# Patient Record
Sex: FEMALE | Race: BLACK OR AFRICAN AMERICAN | Employment: FULL TIME | URBAN - METROPOLITAN AREA
[De-identification: names, ages, dates, MRNs, and addresses within clinical notes are randomized per-mention and may not be internally consistent; named-entity substitution may affect disease eponyms.]

---

## 2020-07-17 NOTE — PROGRESS NOTES
[facility]  [address], [place]   Phone: [phone]  Fax: [phone]        INITIAL CONSULTATION      HISTORY OF PRESENT ILLNESS:  Ivan Johnson is a 71 y.o. female whom is referred from Dr. Ann Orlando secondary to low back pain radiating into BLE in a S1 distribution to the feet involving the digits x 1 month without trauma. She rates her pain 0-8/10. Her pain is exacerbated by standing and relieved with sitting. Positive shopping cart sign. She reports occasional LOB and has recently started using a single point cane. She treated with a prednisone taper with benefit. Patient denies previous spinal surgery, injections, or recent physical therapy/chiropractic care. Pt denies change in bowel or bladder habits. Pt denies fever, weight loss, or skin changes. Patient denies history of glaucoma. PmHx of obesity, DM, hyperthyroidism, HTN, RT knee replacement. The patient has a history of DM and reports blood sugars are well controlled, consistently remaining below 200. Note from Dr. Jessica Pop dated 6/15/2020 indicating patient was seen with c/o acute back pain with sciatica. She has a h/o DM and morbid obesity. Her A1c is stable. Note from Dr. Ann Orlando dated 6/26/2020 indicating patient was seen with c/o low back pain radiating into LLE x 1 week. Progressive in nature. Her pain 10/10. Prednisone taper. L spine XR dated 6/26/2020 films not independently reviewed. Per report, mild degenerative disc disease. Moderate facet arthropathy. Grade 1 anterolisthesis at L4-L5. I independently reviewed the films and noted: small anterior osteophytes noted throughout the L spine. The patient is RHD.  reviewed. Body mass index is 45.61 kg/m².     PCP: Flor Velazquez MD    Past Medical History:   Diagnosis Date    Diabetes ([facility] [address] [facility]     Hyperparathyroidism ([facility] [address] [facility]     Hypertension    80    HX HYSTERECTOMY  1995    HX TONSILLECTOMY          Tobacco Use    Smoking status: Never Smoker    Smokeless tobacco: Never Used   Substance Use Topics    Alcohol use: Not Currently       Work status: N/A. Marital status: . Allergies   Allergen Reactions    Lisinopril Nausea and Vomiting            Family History   Problem Relation Age of Onset    Hypertension Mother     Hypertension Father     Hypertension Sister     Thyroid Disease Sister     Hypertension Brother     Thyroid Disease Brother          REVIEW OF SYSTEMS  Constitutional symptoms: Negative  Eyes: Negative  Ears, Nose, Throat, and Mouth: Negative  Cardiovascular: Negative  Respiratory: Negative  Genitourinary: Negative  Integumentary (Skin and/or breast): Negative  Musculoskeletal: Positive for low back pain radiating into BLE in a S1 distribution to the feet involving the digits. Extremities: Negative for edema. Endocrine/Rheumatologic: Negative  Hematologic/Lymphatic: Negative  Allergic/Immunologic: Negative  Psychiatric: Negative       PHYSICAL EXAMINATION  Visit Vitals  BP 97/41 (BP 1 Location: Left arm, BP Patient Position: Sitting)   Pulse 78   Temp 98.9 °F (37.2 °C) (Temporal)   Ht 5' 2.5\" (1.588 m)   Wt 253 lb 6.4 oz (114.9 kg)   SpO2 100%   BMI 45.61 kg/m²       CONSTITUTIONAL: NAD, A&O x 3  HEART: Regular rate and rhythm  GASTROINTESTINAL: Positive bowel sounds, soft, nontender, and nondistended  LUNGS: Clear to auscultation bilaterally. SKIN: Negative for rash. RANGE OF MOTION: The patient has full passive range of motion in all four extremities. SENSATION: Sensation is intact to light touch throughout. MOTOR:   Straight Leg Raise: Negative, bilateral  Felix: Negative, bilateral  Deep tendon reflexes are 0 at the biceps, triceps, and brachioradialis bilaterally. Deep tendon reflexes are 0 at the knees and ankles bilaterally. Ambulates with a single point cane.      Shoulder AB/Flex Elbow Flex Wrist Ext Elbow Ext Wrist Flex Hand Intrin Tone   Right +4/5 +4/5 +4/5 +4/5 +4/5 +4/5 +4/5   Left +4/5 +4/5 +4/5 +4/5 +4/5 +4/5 +4/5              Hip Flex Knee Ext Knee Flex Ankle DF GTE Ankle PF Tone   Right +4/5 +4/5 +4/5 +4/5 +4/5 +4/5 +4/5   Left +4/5 +4/5 +4/5 +4/5 +4/5 +4/5 +4/5       ASSESSMENT   Diagnoses and all orders for this visit:    1. Lumbosacral spondylosis without myelopathy    2. Obesity, morbid ([place])    3. Lumbar neuritis    4. DDD (degenerative disc disease), lumbar    5. Spondylolisthesis, acquired           IMPRESSIONS/RECOMMENDATIONS:  Patient presents today with c/o low back pain radiating into BLE in a S1 distribution to the feet involving the digits. Multiple treatment options were discussed. I will refer her to physical therapy with an emphasis on HEP. I will try her on Topamax 75 mg qhs. The risks, benefits, and potential side effects of this medication were discussed. Patient understands and wishes to proceed. Patient advised to call the office if intolerant to new medication. Patient is neurologically intact. I will see the patient back in 1 month's time or earlier if needed. Written by Cam Bhadrwaj, as dictated by Morris Gomez MD  I examined the patient, reviewed and agree with the note.

## 2020-07-20 ENCOUNTER — OFFICE VISIT (OUTPATIENT)
Dept: ORTHOPEDIC SURGERY | Age: 65
End: 2020-07-20

## 2020-07-20 VITALS
SYSTOLIC BLOOD PRESSURE: 97 MMHG | OXYGEN SATURATION: 100 % | HEART RATE: 78 BPM | TEMPERATURE: 98.9 F | HEIGHT: 63 IN | DIASTOLIC BLOOD PRESSURE: 41 MMHG | WEIGHT: 253.4 LBS | BODY MASS INDEX: 44.9 KG/M2

## 2020-07-20 DIAGNOSIS — M47.817 LUMBOSACRAL SPONDYLOSIS WITHOUT MYELOPATHY: Primary | ICD-10-CM

## 2020-07-20 DIAGNOSIS — M54.16 LUMBAR NEURITIS: ICD-10-CM

## 2020-07-20 DIAGNOSIS — M43.10 SPONDYLOLISTHESIS, ACQUIRED: ICD-10-CM

## 2020-07-20 DIAGNOSIS — E66.01 OBESITY, MORBID (HCC): ICD-10-CM

## 2020-07-20 DIAGNOSIS — M51.36 DDD (DEGENERATIVE DISC DISEASE), LUMBAR: ICD-10-CM

## 2020-07-20 RX ORDER — TOPIRAMATE 25 MG/1
TABLET ORAL
Qty: 90 TAB | Refills: 1 | Status: SHIPPED | OUTPATIENT
Start: 2020-07-20

## 2020-07-20 RX ORDER — METFORMIN HYDROCHLORIDE 1000 MG/1
TABLET ORAL
COMMUNITY

## 2020-07-20 RX ORDER — ERGOCALCIFEROL 1.25 MG/1
CAPSULE ORAL
COMMUNITY
Start: 2020-06-15

## 2020-07-20 RX ORDER — ALBUTEROL SULFATE 90 UG/1
AEROSOL, METERED RESPIRATORY (INHALATION)
COMMUNITY
Start: 2019-05-02

## 2020-07-20 RX ORDER — TELMISARTAN 40 MG/1
TABLET ORAL
COMMUNITY
Start: 2020-06-15

## 2020-07-20 RX ORDER — IBUPROFEN 800 MG/1
TABLET ORAL
COMMUNITY

## 2020-07-20 RX ORDER — BUDESONIDE AND FORMOTEROL FUMARATE DIHYDRATE 160; 4.5 UG/1; UG/1
AEROSOL RESPIRATORY (INHALATION)
COMMUNITY
Start: 2020-06-01

## 2020-07-20 RX ORDER — TRAMADOL HYDROCHLORIDE 50 MG/1
TABLET ORAL
COMMUNITY
Start: 2020-06-15

## 2020-07-20 RX ORDER — MINERAL OIL
180 ENEMA (ML) RECTAL DAILY
COMMUNITY

## 2020-07-20 RX ORDER — COLCHICINE 0.6 MG/1
CAPSULE ORAL
COMMUNITY
Start: 2020-04-17

## 2020-07-20 RX ORDER — SPIRONOLACTONE AND HYDROCHLOROTHIAZIDE 25; 25 MG/1; MG/1
TABLET ORAL
COMMUNITY
Start: 2020-06-15

## 2020-07-20 NOTE — LETTER
7/20/20 Patient: Richelle Freitas YOB: 1955 Date of Visit: 7/20/2020 Carmine Germain MD 
[address] [facility] [address] VIA Facsimile: [phone] Dear Carmine Germain MD, Thank you for referring Ms. Annabel Freitas to [address] for evaluation. My notes for this consultation are attached. If you have questions, please do not hesitate to call me. I look forward to following your patient along with you. Sincerely, Andrew Herman MD

## 2020-07-23 ENCOUNTER — HOSPITAL ENCOUNTER (OUTPATIENT)
Dept: PHYSICAL THERAPY | Age: 65
Discharge: HOME | End: 2020-07-23
Payer: COMMERCIAL

## 2020-07-23 PROCEDURE — 97110 THERAPEUTIC EXERCISES: CPT | Performed by: PHYSICAL THERAPIST

## 2020-07-23 PROCEDURE — 97162 PT EVAL MOD COMPLEX 30 MIN: CPT | Performed by: PHYSICAL THERAPIST

## 2020-07-23 NOTE — PROGRESS NOTES
PT DAILY TREATMENT NOTE/LUMBAR EVAL 10-18    Patient Name: John Romeo  Date:2020  : 1955  [x]  Patient  Verified  Payor: BLUE CROSS / Plan: [address] [place] [address] / Product Type: PPO /    In time:11:06  Out time:11:55  Total Treatment Time (min): 49  Visit #: 1 of 8    Medicare/BCBS Only   Total Timed Codes (min):  24 1:1 Treatment Time:  39     Treatment Area: Lumbar neuritis [M54.16]  SUBJECTIVE  Pain Level (0-10 scale): 5/10 now; 2/10 at best; 10/10 at worst.  []constant [x]intermittent []improving []worsening []no change since onset    Any medication changes, allergies to medications, adverse drug reactions, diagnosis change, or new procedure performed?: [x] No    [] Yes (see summary sheet for update)  Subjective functional status/changes:     PLOF: Was working every day, was independent with no limitation. Limitations to PLOF: Can't do housework, has to sit frequently when doing dishes. Unable to sweep or vacuum. Mechanism of Injury: Sudden onset of back pain, denies injury or trauma. Current symptoms/Complaints: has intermittent back pain worse with standing and walking. Sitting is better. Pain is located across her lower back and back of her legs and the anterior thighs are sore and her feet are numb. She also notes that she has had one fall last week. Previous Treatment/Compliance: Topamax. OTC advil. PMHx/Surgical Hx: No h/o back surgery. Work Hx: School Nurse. Pt Goals: \"I would like to be able to go back to work and do housework, increase mobility and be able to walk and stand. Get rid of some of the pain\"  Barriers: [x]pain []financial []time []transportation []other  Cognition: A & O x 3    Other:    OBJECTIVE/EXAMINATION  Domestic Life: Lives with her . Activity/Recreational Limitations: Limited mobility and limited function. Mobility: Gait is altered. Difficulty with sit to stand transfers.   Self Care: Able to do this by herself now but needed help initially. Modality rationale: decrease inflammation and decrease pain to improve the patients ability to increase ease of motion to improve function. Min Type Additional Details    [] Estim:  []Unatt       []IFC  []Premod                        []Other:  []w/ice   []w/heat  Position:  Location:    [] Estim: []Att    []TENS instruct  []NMES                    []Other:  []w/US   []w/ice   []w/heat  Position:  Location:    []  Traction: [] Cervical       []Lumbar                       [] Prone          []Supine                       []Intermittent   []Continuous Lbs:  [] before manual  [] after manual    []  Ultrasound: []Continuous   [] Pulsed                           []1MHz   []3MHz Location:  W/cm2:    []  Iontophoresis with dexamethasone         Location: [] Take home patch   [] In clinic   10 [x]  Ice     []  heat  []  Ice massage  []  Laser   []  Anodyne Position: sitting  Location:lower back    []  Laser with stim  []  Other: Position:  Location:    []  Vasopneumatic Device Pressure:       [] lo [] med [] hi   Temperature: [] lo [] med [] hi   [] Skin assessment post-treatment:  []intact []redness- no adverse reaction    []redness  adverse reaction:     15 min []Eval                  []Re-Eval       24 min Therapeutic Exercise:  [] See flow sheet :Emphasis on increasing trunk AROM and core and LE strength. Rationale: increase ROM and increase strength to improve the patients ability to increase her functional activity level.      With   [] TE   [] TA   [] neuro   [] other: Patient Education: [x] Review HEP    [] Progressed/Changed HEP based on:   [] positioning   [] body mechanics   [] transfers   [] heat/ice application    [] other:      Other Objective/Functional Measures:     Physical Therapy Evaluation - Lumbar Spine ([facility])    SUBJECTIVE  Symptoms:  Aggravated by:   [] Bending [] Sitting [x] Standing [x] Walking   [x] Moving [x] Cough [x] Sneeze [x] Valsalva   [x] AM  [] PM  Lying:  [] sup   [] pro   [] sidelying   [] Other:     Eased by:    [] Bending [x] Sitting [] Standing [] Walking   [] Moving [] AM  [x] PM  Lying: [x] sup  [] pro  [] sidelying   [] Other:     Diagnostic Tests: [] Lab work [x] X-rays    [] CT [] MRI     [] Other:  Results:  Dr. Geovanna Blank note of 7/20/20:  \"L spine XR dated 6/26/2020 films not independently reviewed. Per report, mild degenerative disc disease. Moderate facet arthropathy. Grade 1 anterolisthesis at L4-L5. I independently reviewed the films and noted: small anterior osteophytes noted throughout the L spine\"    OBJECTIVE  Posture:  Lateral Shift: [] R    [] L     [] +  [x] -  Kyphosis: [] Increased [] Decreased   []  WNL  Lordosis:  [] Increased [x] Decreased   [] WNL  Pelvic symmetry: [x] WNL    [] Other:    Gait:  [] Normal     [x] Abnormal: Decreased cadance, WBOS. No AD. Active Movements: [] N/A   [] Too acute   [] Other:  ROM % AROM  limited  Comments:pain, area   Forward flexion 40-60 WNL     Extension 20-30 75%  pain   SB right 20-30 WNL  pain   SB left 20-30 25%  pain   Rotation right 5-10 WNL  pain   Rotation left 5-10 WNL  pain     Dural Mobility:  SLR Sitting:   [] R    [] L    [] +    [x] -  @ (degrees):           Supine:   [] R    [x] L    [x] +    [] -  @ (degrees):  60 degrees. Slump Test:   [] R    [] L    [] +    [x] -  @ (degrees):   Femoral Nerve Tension: [] R    [x] L    [x] +    [] -     Palpation  [] Min  [x] Mod  [] Severe    Location: Right and Left Lumbar paraspinal muscles. [] Min  [x] Mod  [] Severe    Location: Midline L/S    Strength   L(0-5) R (0-5) N/T   Hip Flexion (L1,2) 2 p! 2 p!  []   Knee Extension (L3,4) 4+ 4+ []   Ankle Dorsiflexion (L4) 4+ 5 []   Great Toe Extension (L5)   [x]   Ankle Plantarflexion (S1) 5 5 []   Knee Flexion (S1,2) 3 3 []   Hip Extension 2 2 []   Hip Abduction 2 2 []      []      []      []      []     Special Tests  Lumbar:  Iliolumbar ligament test: [x] Pos  [] Neg  bilaterally              Leg Length: [] +    [x] -   Position:Supine    Crests: level in standing    Mobility: Standing flex: Unable to assess due to girdle     Anola Tori:  [x] R    [] L    [x] +    [] -  Lateral hip pain       Global Muscular Weakness:  Abdominals: 1-2/5    Other tests/comments: Able to perform toe gait. Unable to perform heel gait. Pain Level (0-10 scale) post treatment: 2    ASSESSMENT/Changes in Function:Patient with signs and symptoms consistent with acute lower back pain. Patient presents with altered gait and mobility. She has decreased ROM and core and proximal LE strength. Patient has soft tissue tenderness across the lower back as well as midline tenderness. (-) Leg Length Discrepancy. Sitting alleviates her pain. Patient will continue to benefit from skilled PT services to modify and progress therapeutic interventions, address functional mobility deficits, address ROM deficits, address strength deficits, analyze and address soft tissue restrictions, analyze and cue movement patterns, analyze and modify body mechanics/ergonomics and assess and modify postural abnormalities to attain remaining goals. [x]  See Plan of Care  []  See progress note/recertification  []  See Discharge Summary         Progress towards goals / Updated goals:  Short Term Goals: To be accomplished in 1 weeks:  1. Patient will become proficient in their HEP and will be compliant in performing that program.  Evaluation:   Patient given a written/illustrated HEP.       Long Term Goals: To be accomplished in 4 weeks:  1. Patient's pain level will be 2-3/10 with activity in order to improve patient's ability to perform normal ADLs. Evaluation:  2/10-10/10  2. Patient will demonstrate AROM Lumbar Spine WFL to increase ease of ADLs. Evaluation:  AROM L/S flex WNL; ext 75% limited; Right SB WNL, Left SB 25% limited; Right/Left Rotation WNL.   3. Patient will increase FOTO score to 57 to indicate increased functional mobility. Evaluation:  39  4. Patient will increase standing tolerance to 15-20 minutes. Evaluation:  3-5 minutes.       PLAN  [x]  Upgrade activities as tolerated     [x]  Continue plan of care  []  Update interventions per flow sheet       []  Discharge due to:_  []  Other:_      Chris Moscoso, PT 7/23/2020  10:41 AM

## 2020-07-23 NOTE — PROGRESS NOTES
[facility]              [address]        [place], [address] [place]   [phone] fax    Plan of Care/ Statement of Necessity for Physical Therapy Services  Patient name: Claudine Erickson Start of Care: 2020   Referral source: Deepika Hamilton MD : 1955    Medical Diagnosis: Lumbar neuritis [M54.16]  Payor: BLUE CROSS / Plan: [address] [place] [address] / Product Type: PPO /  Onset Date:2020    Treatment Diagnosis:  Low Back Pain   Prior Hospitalization: see medical history Provider#: [id number]   Medications: Verified on Patient summary List    Comorbidities: Arthritis, Asthma, Back pain, BMI 45, Diabetes, Headaches, HBP   Prior Level of Function:  Was working every day, was independent with no limitation. The Plan of Care and following information is based on the information from the initial evaluation. Assessment/ key information: Patient with signs and symptoms consistent with acute lower back pain. Patient presents with altered gait and mobility. She has decreased ROM and core and proximal LE strength. Patient has soft tissue tenderness across the lower back as well as midline tenderness. (-) Leg Length Discrepancy. Sitting alleviates her pain. Patient will benefit from a program of skilled physical therapy to include therapeutic exercises to address strength deficits, therapeutic activities to improve functional mobility, neuromuscular reeducation to address balance, coordination and proprioception, manual therapy to address ROM and tissue extensibility and modalities as indicated. All questions were answered.     Evaluation Complexity History MEDIUM  Complexity : 1-2 comorbidities / personal factors will impact the outcome/ POC ; Examination MEDIUM Complexity : 3 Standardized tests and measures addressing body structure, function, activity limitation and / or participation in recreation  ;Presentation HIGH Complexity : Unstable and unpredictable characteristics  ; Clinical Decision Making MEDIUM Complexity : FOTO score of 26-74  Overall Complexity Rating: MEDIUM  Problem List: pain affecting function, decrease ROM, decrease strength, impaired gait/ balance, decrease ADL/ functional abilitiies, decrease activity tolerance, decrease flexibility/ joint mobility and decrease transfer abilities   Treatment Plan may include any combination of the following: Therapeutic exercise, Therapeutic activities, Neuromuscular re-education, Physical agent/modality, Gait/balance training and Manual therapy  Patient / Family readiness to learn indicated by: asking questions, trying to perform skills and interest  Persons(s) to be included in education: patient (P)  Barriers to Learning/Limitations: None  Patient Goal (s): I would like to be able to go back to work and do housework, increase mobility and be able to walk and stand. Get rid of some of the pain  Patient Self Reported Health Status: good  Rehabilitation Potential: good    Short Term Goals: To be accomplished in 1 weeks:  1. Patient will become proficient in their HEP and will be compliant in performing that program.  Evaluation:   Patient given a written/illustrated HEP. Long Term Goals: To be accomplished in 4 weeks:  1. Patient's pain level will be 2-3/10 with activity in order to improve patient's ability to perform normal ADLs. Evaluation:  2/10-10/10  2. Patient will demonstrate AROM Lumbar Spine WFL to increase ease of ADLs. Evaluation:  AROM L/S flex WNL; ext 75% limited; Right SB WNL, Left SB 25% limited; Right/Left Rotation WNL. 3. Patient will increase FOTO score to 57 to indicate increased functional mobility. Evaluation:  39  4. Patient will increase standing tolerance to 15-20 minutes. Evaluation:  3-5 minutes. Frequency / Duration: Patient to be seen 2 times per week for 4 weeks.     Patient/ Caregiver education and instruction: Diagnosis, prognosis, exercises   [x]  Plan of care has been reviewed with MONALISA Harper, PT 7/23/2020 10:39 AM  ________________________________________________________________________    I certify that the above Therapy Services are being furnished while the patient is under my care. I agree with the treatment plan and certify that this therapy is necessary.     Physician's Signature:____________Date:_________TIME:________    ** Signature, Date and Time must be completed for valid certification **  Please sign and return to [facility]/ [address]              [address] [facility] [place], [address] [place]   [phone] fax

## 2020-07-28 ENCOUNTER — HOSPITAL ENCOUNTER (OUTPATIENT)
Dept: PHYSICAL THERAPY | Age: 65
Discharge: HOME | End: 2020-07-28
Payer: COMMERCIAL

## 2020-07-28 PROCEDURE — 97110 THERAPEUTIC EXERCISES: CPT

## 2020-07-28 PROCEDURE — 97112 NEUROMUSCULAR REEDUCATION: CPT

## 2020-07-28 NOTE — PROGRESS NOTES
PT DAILY TREATMENT NOTE 10-18    Patient Name: Dayana Williamson  Date:2020  : 1955  [x]  Patient  Verified  Payor: BLUE CROSS / Plan: [address] [place] [address] / Product Type: PPO /    In time:5:16  Out time:6:05  Total Treatment Time (min): 49  Visit #: 2 of 8    Medicare/BCBS Only   Total Timed Codes (min):  39 1:1 Treatment Time:  39       Treatment Area: Lumbar neuritis [M54.16]    SUBJECTIVE  Pain Level (0-10 scale): 4  Any medication changes, allergies to medications, adverse drug reactions, diagnosis change, or new procedure performed?: [x] No    [] Yes (see summary sheet for update)  Subjective functional status/changes:   [] No changes reported  Pt reports that her pain is mostly in the morning. OBJECTIVE    Modality rationale: decrease pain to improve the patients ability to decrease difficulty while performing tasks. Min Type Additional Details    [] Estim:  []Unatt       []IFC  []Premod                        []Other:  []w/ice   []w/heat  Position:  Location:    [] Estim: []Att    []TENS instruct  []NMES                    []Other:  []w/US   []w/ice   []w/heat  Position:  Location:    []  Traction: [] Cervical       []Lumbar                       [] Prone          []Supine                       []Intermittent   []Continuous Lbs:  [] before manual  [] after manual    []  Ultrasound: []Continuous   [] Pulsed                           []1MHz   []3MHz W/cm2:  Location:    []  Iontophoresis with dexamethasone         Location: [] Take home patch   [] In clinic   10 [x]  Ice     []  heat  []  Ice massage  []  Laser   []  Anodyne Position:supine  Location:back     []  Laser with stim  []  Other:  Position:  Location:    []  Vasopneumatic Device Pressure:       [] lo [] med [] hi   Temperature: [] lo [] med [] hi   [] Skin assessment post-treatment:  []intact []redness- no adverse reaction    []redness  adverse reaction:           24 min Therapeutic Exercise:  []?  See flow sheet :Emphasis on increasing trunk AROM and core and LE strength. Rationale: increase ROM and increase strength to improve the patients ability to increase her functional activity level. 15 min Neuromuscular Re-education:  [x]  See flow sheet :emphasis placed on core activation and stability. Rationale: increase ROM and increase strength  to improve the patients ability to increase standing tolerance to increase ease with house hold chores. With   [] TE   [] TA   [] neuro   [] other: Patient Education: [x] Review HEP    [] Progressed/Changed HEP based on:   [] positioning   [] body mechanics   [] transfers   [] heat/ice application    [] other:      Other Objective/Functional Measures: Pt reports performing HEP. Pain Level (0-10 scale) post treatment: 5    ASSESSMENT/Changes in Function: Initiated exercises per POC. Reviewed sleeping postions to help decrease morning pain. Patient will continue to benefit from skilled PT services to modify and progress therapeutic interventions, address functional mobility deficits, address ROM deficits, address strength deficits and analyze and address soft tissue restrictions to attain remaining goals. []  See Plan of Care  []  See progress note/recertification  []  See Discharge Summary         Progress towards goals / Updated goals:  Short Term Goals: To be accomplished in 1 weeks:  1.  Patient will become proficient in their HEP and will be compliant in performing that program.  Evaluation:   Patient given a written/illustrated HEP. Current: Goal met: Pt reports performing HEP. 7/28/20      Long Term Goals: To be accomplished in 4 weeks:  1. Patient's pain level will be 2-3/10 with activity in order to improve patient's ability to perform normal ADLs. Evaluation:  2/10-10/10  2. Patient will demonstrate AROM Lumbar Spine WFL to increase ease of ADLs.   Evaluation:  AROM L/S flex WNL; ext 75% limited; Right SB WNL, Left SB 25% limited; Right/Left Rotation WNL.  3. Patient will increase FOTO score to 57 to indicate increased functional mobility. Evaluation:  39  4. Patient will increase standing tolerance to 15-20 minutes.   Evaluation:  3-5 minutes.       PLAN  []  Upgrade activities as tolerated     [x]  Continue plan of care  []  Update interventions per flow sheet       []  Discharge due to:_  []  Other:_      Regine Saleh, MONALISA 7/28/2020  5:19 PM    Future Appointments   Date Time Provider [facility]   7/30/2020 11:00 AM So Font[place] [facility]   8/17/2020  3:40 PM Shameka Wood MD [facility]

## 2020-07-30 ENCOUNTER — HOSPITAL ENCOUNTER (OUTPATIENT)
Dept: PHYSICAL THERAPY | Age: 65
Discharge: HOME | End: 2020-07-30
Payer: COMMERCIAL

## 2020-07-30 PROCEDURE — 97112 NEUROMUSCULAR REEDUCATION: CPT

## 2020-07-30 PROCEDURE — 97110 THERAPEUTIC EXERCISES: CPT

## 2020-07-30 NOTE — PROGRESS NOTES
PT DAILY TREATMENT NOTE 10-18    Patient Name: Claudine Erickson  Date:2020  : 1955  [x]  Patient  Verified  Payor: BLUE CROSS / Plan: [address] [place] [address] / Product Type: PPO /    In time:11:00  Out time:11:56  Total Treatment Time (min): 64  Visit #: 3 of 8    Medicare/BCBS Only   Total Timed Codes (min):  46 1:1 Treatment Time:  46       Treatment Area: Lumbar neuritis [M54.16]    SUBJECTIVE  Pain Level (0-10 scale): 4  Any medication changes, allergies to medications, adverse drug reactions, diagnosis change, or new procedure performed?: [x] No    [] Yes (see summary sheet for update)  Subjective functional status/changes:   [] No changes reported  Pt reports she was sore after last therapy session. OBJECTIVE    Modality rationale: decrease pain to improve the patients ability to decrease difficulty while performing tasks. Min Type Additional Details    [] Estim:  []Unatt       []IFC  []Premod                        []Other:  []w/ice   []w/heat  Position:  Location:    [] Estim: []Att    []TENS instruct  []NMES                    []Other:  []w/US   []w/ice   []w/heat  Position:  Location:    []  Traction: [] Cervical       []Lumbar                       [] Prone          []Supine                       []Intermittent   []Continuous Lbs:  [] before manual  [] after manual    []  Ultrasound: []Continuous   [] Pulsed                           []1MHz   []3MHz W/cm2:  Location:    []  Iontophoresis with dexamethasone         Location: [] Take home patch   [] In clinic   10 [x]  Ice     []  heat  []  Ice massage  []  Laser   []  Anodyne Position:supine  Location:back     []  Laser with stim  []  Other:  Position:  Location:    []  Vasopneumatic Device Pressure:       [] lo [] med [] hi   Temperature: [] lo [] med [] hi   [] Skin assessment post-treatment:  []intact []redness- no adverse reaction    []redness  adverse reaction:           31 min Therapeutic Exercise:  []? ? See flow sheet :Emphasis on increasing trunk AROM and core and LE strength. Rationale: increase ROM and increase strength to improve the patients ability to increase her functional activity level.      15 min Neuromuscular Re-education:  [x]? See flow sheet :emphasis placed on core activation and stability. Rationale: increase ROM and increase strength  to improve the patients ability to increase standing tolerance to increase ease with house hold chores.                With   [] TE   [] TA   [] neuro   [] other: Patient Education: [x] Review HEP    [] Progressed/Changed HEP based on:   [] positioning   [] body mechanics   [] transfers   [] heat/ice application    [] other:      Other Objective/Functional Measures: minimal assist needed from UE to perform sit to stand. Pain Level (0-10 scale) post treatment: 2    ASSESSMENT/Changes in Function: No initiation of new exercises this session due to being sore and pt's asthma bothering her. Pt has increase in pain with transition from sit to stand. Patient will continue to benefit from skilled PT services to modify and progress therapeutic interventions, address functional mobility deficits, address ROM deficits, address strength deficits and analyze and address soft tissue restrictions to attain remaining goals. []  See Plan of Care  []  See progress note/recertification  []  See Discharge Summary         Progress towards goals / Updated goals:  Short Term Goals: To be accomplished in 1 weeks:  1.  Patient will become proficient in their HEP and will be compliant in performing that program.  Evaluation:   Patient given a written/illustrated HEP. Current: Goal met: Pt reports performing HEP. 7/28/20      Long Term Goals: To be accomplished in 4 weeks:  1. Patient's pain level will be 2-3/10 with activity in order to improve patient's ability to perform normal ADLs. Evaluation:  2/10-10/10  2.  Patient will demonstrate AROM Lumbar Spine WFL to increase ease of ADLs.  Evaluation:  AROM L/S flex WNL; ext 75% limited; Right SB WNL, Left SB 25% limited; Right/Left Rotation WNL. 3. Patient will increase FOTO score to 57 to indicate increased functional mobility. Evaluation:  39  4. Patient will increase standing tolerance to 15-20 minutes.   Evaluation:  3-5 minutes.       PLAN  []  Upgrade activities as tolerated     [x]  Continue plan of care  []  Update interventions per flow sheet       []  Discharge due to:_  []  Other:_      Amari Boyd, MONALISA 7/30/2020  11:04 AM    Future Appointments   Date Time Provider Kumar Crocker   8/4/2020 11:15 AM Fran Hickman PT [facility]   8/6/2020 11:00 AM Parrish Hobbs [facility]   8/17/2020  3:40 PM Misael Law MD [facility]

## 2020-08-04 ENCOUNTER — HOSPITAL ENCOUNTER (OUTPATIENT)
Dept: PHYSICAL THERAPY | Age: 65
Discharge: HOME | End: 2020-08-04
Payer: COMMERCIAL

## 2020-08-04 PROCEDURE — 97112 NEUROMUSCULAR REEDUCATION: CPT | Performed by: PHYSICAL THERAPIST

## 2020-08-04 PROCEDURE — 97110 THERAPEUTIC EXERCISES: CPT | Performed by: PHYSICAL THERAPIST

## 2020-08-04 NOTE — PROGRESS NOTES
PT DAILY TREATMENT NOTE 10-18    Patient Name: Claudine Erickson  Date:2020  : 1955  [x]  Patient  Verified  Payor: BLUE CROSS / Plan: [address] [place] [address] / Product Type: PPO /    In time:11:11  Out time:12:03  Total Treatment Time (min): 52  Visit #: 4 of 8    Medicare/BCBS Only   Total Timed Codes (min):  42 1:1 Treatment Time:  42       Treatment Area: Lumbar neuritis [M54.16]    SUBJECTIVE  Pain Level (0-10 scale): 0  Any medication changes, allergies to medications, adverse drug reactions, diagnosis change, or new procedure performed?: [x] No    [] Yes (see summary sheet for update)  Subjective functional status/changes:   [] No changes reported  Patient notes that she is having no pain today. 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 OBJECTIVE    Modality rationale: decrease inflammation and decrease pain to improve the patients ability to increase ease of motion to improve function.    Min Type Additional Details    [] Estim:  []Unatt       []IFC  []Premod                        []Other:  []w/ice   []w/heat  Position:  Location:    [] Estim: []Att    []TENS instruct  []NMES                    []Other:  []w/US   []w/ice   []w/heat  Position:  Location:    []  Traction: [] Cervical       []Lumbar                       [] Prone          []Supine                       []Intermittent   []Continuous Lbs:  [] before manual  [] after manual    []  Ultrasound: []Continuous   [] Pulsed                           []1MHz   []3MHz W/cm2:  Location:    []  Iontophoresis with dexamethasone         Location: [] Take home patch   [] In clinic   10 [x]  Ice     []  heat  []  Ice massage  []  Laser   []  Anodyne Position:supine  Location:lower back    []  Laser with stim  []  Other:  Position:  Location:    []  Vasopneumatic Device Pressure:       [] lo [] med [] hi   Temperature: [] lo [] med [] hi   [] Skin assessment post-treatment: []intact []redness- no adverse reaction    []redness  adverse reaction:       27 min Therapeutic Exercise:  [] See flow sheet :Emphasis on increasing trunk AROM and core and LE strength. Rationale: increase ROM and increase strength to improve the patients ability to increase her functional activity level. 15 min Neuromuscular Re-education:  []  See flow sheet :emphasis on recruitment and activation of core muscles to improve trunk proprioception and kinesthetic awareness. Rationale: increase ROM, improve coordination and increase proprioception  to improve the patients ability to increase standing tolerance and improve ability to perform household chores. With   [] TE   [] TA   [] neuro   [] other: Patient Education: [x] Review HEP    [] Progressed/Changed HEP based on:   [] positioning   [] body mechanics   [] transfers   [] heat/ice application    [] other:      Other Objective/Functional Measures: Added TA with LE extension and samurai with SB. Pain Level (0-10 scale) post treatment: 0    ASSESSMENT/Changes in Function: Patient with improved functional activity level and decreased pain. Patient will continue to benefit from skilled PT services to modify and progress therapeutic interventions, address functional mobility deficits, address ROM deficits, address strength deficits and analyze and address soft tissue restrictions to attain remaining goals. [x]  See Plan of Care  []  See progress note/recertification  []  See Discharge Summary         Progress towards goals / Updated goals:  Short Term Goals: To be accomplished in 1 weeks:  1.  Patient will become proficient in their HEP and will be compliant in performing that program.  Evaluation:   Patient given a written/illustrated HEP. Current: Goal met: Pt reports performing HEP. 7/28/20      Long Term Goals: To be accomplished in 4 weeks:  1.  Patient's pain level will be 2-3/10 with activity in order to improve patient's ability to perform normal ADLs. Evaluation:  2/10-10/10  Current:  0/10-4/10.  8/4/2020. Progressing. 2. Patient will demonstrate AROM Lumbar Spine WFL to increase ease of ADLs. Evaluation:  AROM L/S flex WNL; ext 75% limited; Right SB WNL, Left SB 25% limited; Right/Left Rotation WNL. 3. Patient will increase FOTO score to 57 to indicate increased functional mobility. Evaluation:  39  4. Patient will increase standing tolerance to 15-20 minutes. Evaluation:  3-5 minutes. 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 8/4/2020.   Goal Met.       PLAN  [x]  Upgrade activities as tolerated     [x]  Continue plan of care  []  Update interventions per flow sheet       []  Discharge due to:_  []  Other:_      Bruna Bailey, PT 8/4/2020  11:21 AM    Future Appointments   Date Time Provider Kumar Crocker   8/6/2020 11:00 AM Jj Vargas [facility]   8/17/2020  3:40 PM Chico Kan MD [facility]

## 2020-08-06 ENCOUNTER — APPOINTMENT (OUTPATIENT)
Dept: PHYSICAL THERAPY | Age: 65
End: 2020-08-06
Payer: COMMERCIAL

## 2020-08-07 ENCOUNTER — HOSPITAL ENCOUNTER (OUTPATIENT)
Dept: PHYSICAL THERAPY | Age: 65
Discharge: HOME | End: 2020-08-07
Payer: COMMERCIAL

## 2020-08-07 PROCEDURE — 97110 THERAPEUTIC EXERCISES: CPT | Performed by: PHYSICAL THERAPIST

## 2020-08-07 PROCEDURE — 97112 NEUROMUSCULAR REEDUCATION: CPT | Performed by: PHYSICAL THERAPIST

## 2020-08-07 NOTE — PROGRESS NOTES
PT DAILY TREATMENT NOTE 10-18    Patient Name: Rosa Shanks  Date:2020  : 1955  [x]  Patient  Verified  Payor: BLUE CROSS / Plan: [address] [place] [address] / Product Type: PPO /    In time:9:00  Out time:9:55  Total Treatment Time (min): 55  Visit #: 5 of 8    Medicare/BCBS Only   Total Timed Codes (min):  45 1:1 Treatment Time:  45       Treatment Area: Lumbar neuritis [M54.16]    SUBJECTIVE  Pain Level (0-10 scale): 0  Any medication changes, allergies to medications, adverse drug reactions, diagnosis change, or new procedure performed?: [x] No    [] Yes (see summary sheet for update)  Subjective functional status/changes:   [] No changes reported  Patient reports she is not having any pain and is able to do all of her normal activities. States she returns to work next week. OBJECTIVE    Modality rationale: decrease pain/discomfort associated with performance of her TE and to improve the patients ability to increase ease of motion to improve function.    Min Type Additional Details    [] Estim:  []Unatt       []IFC  []Premod                        []Other:  []w/ice   []w/heat  Position:  Location:    [] Estim: []Att    []TENS instruct  []NMES                    []Other:  []w/US   []w/ice   []w/heat  Position:  Location:    []  Traction: [] Cervical       []Lumbar                       [] Prone          []Supine                       []Intermittent   []Continuous Lbs:  [] before manual  [] after manual    []  Ultrasound: []Continuous   [] Pulsed                           []1MHz   []3MHz W/cm2:  Location:    []  Iontophoresis with dexamethasone         Location: [] Take home patch   [] In clinic    []  Ice     []  heat  []  Ice massage  []  Laser   []  Anodyne Position:  Location:    []  Laser with stim  []  Other:  Position:  Location:    []  Vasopneumatic Device Pressure:       [] lo [] med [] hi   Temperature: [] lo [] med [] hi   [] Skin assessment post-treatment:  []intact []redness- no adverse reaction    []redness  adverse reaction:       30 min Therapeutic Exercise:  [] See flow sheet :Emphasis on increasing trunk AROM and core and LE strength. Rationale: increase ROM and increase strength to improve the patients ability to increase her functional activity level. 15 min Neuromuscular Re-education:  []  See flow sheet :emphasis on recruitment and activation of core muscles to improve trunk proprioception and kinesthetic awareness. Rationale: increase ROM, improve coordination and increase proprioception  to improve the patients ability to perform household chores. With   [] TE   [] TA   [] neuro   [] other: Patient Education: [x] Review HEP    [] Progressed/Changed HEP based on:   [] positioning   [] body mechanics   [] transfers   [] heat/ice application    [] other:      Other Objective/Functional Measures: Progressed to being able to perform bridges. Pain Level (0-10 scale) post treatment: 0    ASSESSMENT/Changes in Function: Patient has decreased pain and improved functional activity level. Patient will continue to benefit from skilled PT services to modify and progress therapeutic interventions, address functional mobility deficits, address ROM deficits, address strength deficits and analyze and address soft tissue restrictions to attain remaining goals. [x]  See Plan of Care  []  See progress note/recertification  []  See Discharge Summary         Progress towards goals / Updated goals:  Short Term Goals: To be accomplished in 1 weeks:  1.  Patient will become proficient in their HEP and will be compliant in performing that program.  Evaluation:   Patient given a written/illustrated HEP. Current: Goal met: Pt reports performing HEP. 7/28/20      Long Term Goals: To be accomplished in 4 weeks:  1. Patient's pain level will be 2-3/10 with activity in order to improve patient's ability to perform normal ADLs. Evaluation:  2/10-10/10  Current:  0/10-2/10. 8/7/2020. Goal Met.  2. Patient will demonstrate AROM Lumbar Spine WFL to increase ease of ADLs. Evaluation:  AROM L/S flex WNL; ext 75% limited; Right SB WNL, Left SB 25% limited; Right/Left Rotation WNL. 3. Patient will increase FOTO score to 57 to indicate increased functional mobility. Evaluation:  39  4. Patient will increase standing tolerance to 15-20 minutes. Evaluation:  3-5 minutes. 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 8/4/2020. Goal Met.     PLAN  [x]  Upgrade activities as tolerated     [x]  Continue plan of care  []  Update interventions per flow sheet       []  Discharge due to:_  []  Other:_      Renetta Zepeda, PT 8/7/2020  9:06 AM    Future Appointments   Date Time Provider Kumar Crocker   8/17/2020  3:40 PM MD Natalio Hui

## 2020-08-12 ENCOUNTER — HOSPITAL ENCOUNTER (OUTPATIENT)
Dept: PHYSICAL THERAPY | Age: 65
End: 2020-08-12
Payer: COMMERCIAL

## 2020-08-13 ENCOUNTER — HOSPITAL ENCOUNTER (OUTPATIENT)
Dept: PHYSICAL THERAPY | Age: 65
Discharge: HOME | End: 2020-08-13
Payer: COMMERCIAL

## 2020-08-13 PROCEDURE — 97112 NEUROMUSCULAR REEDUCATION: CPT

## 2020-08-13 PROCEDURE — 97110 THERAPEUTIC EXERCISES: CPT

## 2020-08-13 NOTE — PROGRESS NOTES
PT DAILY TREATMENT NOTE 10-18    Patient Name: Denise Castanon  Date:2020  : 1955  [x]  Patient  Verified  Payor: Bhargavi Madrid / Plan: [address] [place] [address] / Product Type: PPO /    In time:3:40  Out time:4:36   Total Treatment Time (min): 56  Visit #: 6 of 8    Medicare/BCBS Only   Total Timed Codes (min):  46 1:1 Treatment Time:  46       Treatment Area: Lumbar neuritis [M54.16]    SUBJECTIVE  Pain Level (0-10 scale): 0  Any medication changes, allergies to medications, adverse drug reactions, diagnosis change, or new procedure performed?: [x] No    [] Yes (see summary sheet for update)  Subjective functional status/changes:   [] No changes reported  Pt reports she mostly has pain in the morning in her back. OBJECTIVE    Modality rationale: decrease pain to improve the patients ability to decrease difficulty while performing tasks. Min Type Additional Details    [] Estim:  []Unatt       []IFC  []Premod                        []Other:  []w/ice   []w/heat  Position:  Location:    [] Estim: []Att    []TENS instruct  []NMES                    []Other:  []w/US   []w/ice   []w/heat  Position:  Location:    []  Traction: [] Cervical       []Lumbar                       [] Prone          []Supine                       []Intermittent   []Continuous Lbs:  [] before manual  [] after manual    []  Ultrasound: []Continuous   [] Pulsed                           []1MHz   []3MHz W/cm2:  Location:    []  Iontophoresis with dexamethasone         Location: [] Take home patch   [] In clinic   10 [x]  Ice     []  heat  []  Ice massage  []  Laser   []  Anodyne Position:sitting  Location:back     []  Laser with stim  []  Other:  Position:  Location:    []  Vasopneumatic Device Pressure:       [] lo [] med [] hi   Temperature: [] lo [] med [] hi   [] Skin assessment post-treatment:  []intact []redness- no adverse reaction    []redness  adverse reaction:           31 min Therapeutic Exercise:  []?  See flow sheet :Emphasis on increasing trunk AROM and core and LE strength. Rationale: increase ROM and increase strength to improve the patients ability to increase her functional activity level.     15 min Neuromuscular Re-education:  []? See flow sheet :emphasis on recruitment and activation of core muscles to improve trunk proprioception and kinesthetic awareness. Rationale: increase ROM, improve coordination and increase proprioception  to improve the patients ability to perform household chores. With   [] TE   [] TA   [] neuro   [] other: Patient Education: [x] Review HEP    [] Progressed/Changed HEP based on:   [] positioning   [] body mechanics   [] transfers   [] heat/ice application    [] other:      Other Objective/Functional Measures:AROM L/S WNL in all planes, pain with right SB and rotation. Pain Level (0-10 scale) post treatment:0    ASSESSMENT/Changes in Function: Encouraged pt to performing stretches before getting out of bed to help with the morning pain. Patient will continue to benefit from skilled PT services to modify and progress therapeutic interventions, address functional mobility deficits, address ROM deficits, address strength deficits and analyze and address soft tissue restrictions to attain remaining goals. []  See Plan of Care  []  See progress note/recertification  []  See Discharge Summary         Progress towards goals / Updated goals:  Short Term Goals: To be accomplished in 1 weeks:  1.  Patient will become proficient in their HEP and will be compliant in performing that program.  Evaluation:   Patient given a written/illustrated HEP. Current: Goal met: Pt reports performing HEP. 7/28/20      Long Term Goals: To be accomplished in 4 weeks:  1. Patient's pain level will be 2-3/10 with activity in order to improve patient's ability to perform normal ADLs.   Evaluation:  2/10-10/10  Current:  0/10-2/10.  8/7/2020.  Goal Met.  2. Patient will demonstrate AROM Lumbar Spine WFL to increase ease of ADLs. Evaluation:  AROM L/S flex WNL; ext 75% limited; Right SB WNL, Left SB 25% limited; Right/Left Rotation WNL. Current: Goal met: AROM L/S WNL in all planes, pain with right SB and rotation. 8/13/20  3. Patient will increase FOTO score to 57 to indicate increased functional mobility. Evaluation:  39  4. Patient will increase standing tolerance to 15-20 minutes. Evaluation:  3-5 minutes.   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  8/4/2020.  Goal Met.       PLAN  []  Upgrade activities as tolerated     [x]  Continue plan of care  []  Update interventions per flow sheet       []  Discharge due to:_  []  Other:_      Brittney Torres PTA 8/13/2020  3:46 PM    Future Appointments   Date Time Provider Kumar Crockre   8/14/2020  8:00 AM Marita Downs PTA [facility]   8/17/2020  3:40 PM Dorothea Whitman MD [facility]

## 2020-08-14 ENCOUNTER — HOSPITAL ENCOUNTER (OUTPATIENT)
Dept: PHYSICAL THERAPY | Age: 65
Discharge: HOME | End: 2020-08-14
Payer: COMMERCIAL

## 2020-08-14 PROCEDURE — 97110 THERAPEUTIC EXERCISES: CPT

## 2020-08-14 PROCEDURE — 97112 NEUROMUSCULAR REEDUCATION: CPT

## 2020-08-14 NOTE — PROGRESS NOTES
PT DAILY TREATMENT NOTE 10-18    Patient Name: Suzan Barger  Date:2020  : 1955  [x]  Patient  Verified  Payor: BLUE CROSS / Plan: [address] [place] [address] / Product Type: PPO /    In time:8:13  Out time:9:04  Total Treatment Time (min): 51  Visit #: 7 of 8    Medicare/BCBS Only   Total Timed Codes (min):  41 1:1 Treatment Time:  41       Treatment Area: Lumbar neuritis [M54.16]    SUBJECTIVE  Pain Level (0-10 scale): 0  Any medication changes, allergies to medications, adverse drug reactions, diagnosis change, or new procedure performed?: [x] No    [] Yes (see summary sheet for update)  Subjective functional status/changes:   [] No changes reported  Pt's only complaint is having morning stiffness pain. OBJECTIVE    Modality rationale: decrease pain to improve the patients ability to decrease difficulty while performing tasks. Min Type Additional Details    [] Estim:  []Unatt       []IFC  []Premod                        []Other:  []w/ice   []w/heat  Position:  Location:    [] Estim: []Att    []TENS instruct  []NMES                    []Other:  []w/US   []w/ice   []w/heat  Position:  Location:    []  Traction: [] Cervical       []Lumbar                       [] Prone          []Supine                       []Intermittent   []Continuous Lbs:  [] before manual  [] after manual    []  Ultrasound: []Continuous   [] Pulsed                           []1MHz   []3MHz W/cm2:  Location:    []  Iontophoresis with dexamethasone         Location: [] Take home patch   [] In clinic   10 [x]  Ice     []  heat  []  Ice massage  []  Laser   []  Anodyne Position:sitting  Location:back     []  Laser with stim  []  Other:  Position:  Location:    []  Vasopneumatic Device Pressure:       [] lo [] med [] hi   Temperature: [] lo [] med [] hi   [] Skin assessment post-treatment:  []intact []redness- no adverse reaction    []redness  adverse reaction:              26 min Therapeutic Exercise:  []? ? See flow sheet :Emphasis on increasing trunk AROM and core and LE strength. Rationale: increase ROM and increase strength to improve the patients ability to increase her functional activity level.     15 min Neuromuscular Re-education:  []??  See flow sheet :emphasis on recruitment and activation of core muscles to improve trunk proprioception and kinesthetic awareness. Rationale: increase ROM, improve coordination and increase proprioception  to improve the patients ability to perform household chores. With   [] TE   [] TA   [] neuro   [] other: Patient Education: [x] Review HEP    [] Progressed/Changed HEP based on:   [] positioning   [] body mechanics   [] transfers   [] heat/ice application    [] other:      Other Objective/Functional Measures: see goals. Pain Level (0-10 scale) post treatment: 0    ASSESSMENT/Changes in Function: Pt reports 80% improvement since [facility]. Pts lumbar AROM with WNL. 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 Pt has increased standing tolerance to 30 mins. []  See Plan of Care  []  See progress note/recertification  [x]  See Discharge Summary         Progress towards goals / Updated goals:  Short Term Goals: To be accomplished in 1 weeks:  1.  Patient will become proficient in their HEP and will be compliant in performing that program.  Evaluation:   Patient given a written/illustrated HEP. Current: Goal met: Pt reports performing HEP. 7/28/20      Long Term Goals: To be accomplished in 4 weeks:  1. Patient's pain level will be 2-3/10 with activity in order to improve patient's ability to perform normal ADLs. Evaluation:  2/10-10/10  Current:  0/10-2/10.  8/7/2020.  Goal Met.  2. Patient will demonstrate AROM Lumbar Spine WFL to increase ease of ADLs. Evaluation:  AROM L/S flex WNL; ext 75% limited; Right SB WNL, Left SB 25% limited; Right/Left Rotation WNL.   Current: Goal met: AROM L/S WNL in all planes, pain with right SB and rotation. 8/13/20  3. Patient will increase FOTO score to 57 to indicate increased functional mobility. Evaluation:  39  Current: Progressing: FOTO = 53. 8/14/2020  4. Patient will increase standing tolerance to 15-20 minutes. Evaluation:  3-5 minutes. 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  8/4/2020.  Goal Met. PLAN  []  Upgrade activities as tolerated     []  Continue plan of care  []  Update interventions per flow sheet       [x]  Discharge due to:_Pt met most LTGs.    []  Other:_      [address]illa Ave, PTA 8/14/2020  8:23 AM    Future Appointments   Date Time Provider Kumar Lizzette   8/17/2020  3:40 PM Natalie Hanna MD [facility]

## 2020-08-14 NOTE — PROGRESS NOTES
Physical Therapy Discharge Instructions      [facility]   [address] [facility]   [place], [address]  [phone] fax    Patient: Heena Shi  : 1955      Continue Home Exercise Program 1 times per day for 4 weeks, then decrease to 3 times per week      Continue with    [x] Ice  as needed      [x] Heat           Follow up with MD:     [] Upon completion of therapy     [x] As needed      Recommendations:     [x]   Return to activity with home program    []   Return to activity with the following modifications:       []Post Rehab Program    []Join Independent aquatic program     []Return to/join local gym      Additional Comments: Stretch in the morning before getting out of bed to help manage morning stiffness.      Yoselin Ray, PTA 2020 8:44 AM

## 2020-08-14 NOTE — PROGRESS NOTES
[facility]              [address] [facility]        [place], [address]   [phone] fax    Discharge Summary  Patient name: Reji Jackson Start of Care: 3030   Referral source: Bernabe Cates MD : 1955   Medical/Treatment Diagnosis: Lumbar neuritis [M54.16]  Payor: Dk Thomas / Plan: [address] [place] [address] / Product Type: PPO /  Onset Date:2020     Prior Hospitalization: see medical history Provider#: [id number]   Medications: Verified on Patient Summary List    Comorbidities: Arthritis, Asthma, Back pain, BMI 45, Diabetes, Headaches, HBP   Prior Level of Function:  Was working every day, was independent with no limitation. Visits from Start of Care: 7    Missed Visits: 2  Reporting Period : 2020 to 2020    Summary of Care:  Short Term Goals: To be accomplished in 1 weeks:  1.  Patient will become proficient in their HEP and will be compliant in performing that program.  Evaluation:   Patient given a written/illustrated HEP. Current: Goal met: Pt reports performing HEP.      Long Term Goals: To be accomplished in 4 weeks:  1. Patient's pain level will be 2-3/10 with activity in order to improve patient's ability to perform normal ADLs. Evaluation:  2/10-10/10  Current:  0/10-2/10.  Goal Met.  2. Patient will demonstrate AROM Lumbar Spine WFL to increase ease of ADLs. Evaluation:  AROM L/S flex WNL; ext 75% limited; Right SB WNL, Left SB 25% limited; Right/Left Rotation WNL. Current: Goal met: AROM L/S WNL in all planes, pain with right SB and rotation. 3. Patient will increase FOTO score to 57 to indicate increased functional mobility. Evaluation:  39  Current: Progressing: FOTO = 53.   4. Patient will increase standing tolerance to 15-20 minutes. Evaluation:  3-5 minutes. 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       ASSESSMENT/RECOMMENDATIONS:  Pt reports 80% improvement since [facility]. Pts lumbar AROM with WNL. 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 Pt has increased standing tolerance to 30 mins.     [x]Discontinue therapy: [x]Patient has reached or is progressing toward set goals      []Patient is non-compliant or has abdicated      []Due to lack of appreciable progress towards set goals    Ivett Keith, PTA 8/14/2020 8:30 AM

## 2020-08-14 NOTE — PROGRESS NOTES
[facility]  [address], [place]   Phone: [phone]  Fax: [phone]        PROGRESS NOTE      HISTORY OF PRESENT ILLNESS:  The patient is a 71 y.o. female and was seen today for follow up of low back pain radiating into BLE in a S1 distribution to the feet involving the digits x 1 month without trauma. Her pain is exacerbated by standing and relieved with sitting. Positive shopping cart sign. She reports occasional LOB and has recently started using a single point cane. She treated with a prednisone taper with benefit. Patient denies previous spinal surgery, injections, or recent physical therapy/chiropractic care. Pt denies change in bowel or bladder habits. Pt denies fever, weight loss, or skin changes. Patient denies history of glaucoma. The patient is RHD. PmHx of obesity, DM, hyperthyroidism, HTN, RT knee replacement. The patient has a history of DM and reports blood sugars are well controlled, consistently remaining below 200. Note from Dr. Senthil Quinn dated 6/15/2020 indicating patient was seen with c/o acute back pain with sciatica. She has a h/o DM and morbid obesity. Her A1c is stable. Note from Dr. Bethanie Clarke dated 6/26/2020 indicating patient was seen with c/o low back pain radiating into LLE x 1 week. Progressive in nature. Her pain 10/10. Prednisone taper. L spine XR dated 6/26/2020 films not independently reviewed. Per report, mild degenerative disc disease. Moderate facet arthropathy. Grade 1 anterolisthesis at L4-L5. I independently reviewed the films and noted: small anterior osteophytes noted throughout the L spine. At her last clinical appointment, I referred her to physical therapy with an emphasis on HEP. I tried her on Topamax 75 mg qhs.        The patient returns today with low back and buttocks pain. She rates her pain 1-3/10, previously 0-8/10. She reports improved standing and walking tolerance.  Pt reports she filled the Topamax 75 mg qhs but did not take the medication. Pt completed PT with benefit. She is compliant with her HEP. The patient has a history of DM and reports blood sugars are well controlled, consistently remaining below 200. Pt denies change in bowel or bladder habits.  reviewed. Body mass index is 45.54 kg/m².     PCP: Peng Mullins MD      Past Medical History:   Diagnosis Date    Diabetes ([facility]     Hyperparathyroidism ([facility]     Hypertension         Social History     Socioeconomic History    Marital status:      Spouse name: Not on file    Number of children: Not on file    Years of education: Not on file    Highest education level: Not on file   Occupational History    Not on file   Social Needs    Financial resource strain: Not on file    Food insecurity     Worry: Not on file     Inability: Not on file    Transportation needs     Medical: Not on file     Non-medical: Not on file   Tobacco Use    Smoking status: Never Smoker    Smokeless tobacco: Never Used   Substance and Sexual Activity    Alcohol use: Not Currently    Drug use: Not on file    Sexual activity: Not on file   Lifestyle    Physical activity     Days per week: Not on file     Minutes per session: Not on file    Stress: Not on file   Relationships    Social connections     Talks on phone: Not on file     Gets together: Not on file     Attends Zoroastrianism service: Not on file     Active member of club or organization: Not on file     Attends meetings of clubs or organizations: Not on file     Relationship status: Not on file    Intimate partner violence     Fear of current or ex partner: Not on file     Emotionally abused: Not on file     Physically abused: Not on file     Forced sexual activity: Not on file   Other Topics Concern    Not on file   Social History Narrative    Not on file       Current Outpatient Medications   Medication Sig Dispense Refill    traMADoL (ULTRAM) 50 mg tablet       albuterol (Ventolin HFA) 90 mcg/actuation inhaler Ventolin HFA 90 mcg/actuation aerosol inhaler      metFORMIN (GLUCOPHAGE) 1,000 mg tablet metformin 1,000 mg tablet      Symbicort 160-4.5 mcg/actuation HFAA       ergocalciferol (ERGOCALCIFEROL) 1,250 mcg (50,000 unit) capsule Vitamin D2 1,250 mcg (50,000 unit) capsule      fexofenadine (ALLEGRA) 180 mg tablet Take 180 mg by mouth daily.  spironolactone-hydrochlorothiazide (ALDACTAZIDE) 25-25 mg per tablet       telmisartan (MICARDIS) 40 mg tablet       colchicine (MITIGARE) 0.6 mg capsule       ibuprofen (MOTRIN) 800 mg tablet Take  by mouth.  topiramate (TOPAMAX) 25 mg tablet 3 tabs PO QHS 90 Tab 1       Allergies   Allergen Reactions    Lisinopril Nausea and Vomiting          PHYSICAL EXAMINATION    Visit Vitals  /73 (BP 1 Location: Left arm, BP Patient Position: Sitting)   Pulse 76   Temp 98.4 °F (36.9 °C) (Temporal)   Wt 253 lb (114.8 kg)   SpO2 100%   BMI 45.54 kg/m²       CONSTITUTIONAL: NAD, A&O x 3  SENSATION: Intact to light touch throughout  RANGE OF MOTION: The patient has full passive range of motion in all four extremities. MOTOR:  Straight Leg Raise: Negative, bilateral                 Hip Flex Knee Ext Knee Flex Ankle DF GTE Ankle PF Tone   Right +4/5 +4/5 +4/5 +4/5 +4/5 +4/5 +4/5   Left +4/5 +4/5 +4/5 +4/5 +4/5 +4/5 +4/5       ASSESSMENT   Diagnoses and all orders for this visit:    1. Lumbosacral spondylosis without myelopathy    2. Obesity, morbid ([address]    3. Lumbar neuritis    4. DDD (degenerative disc disease), lumbar    5. Spondylolisthesis, acquired        IMPRESSION AND PLAN:  Patient returns to the office today with c/o low back and buttocks pain. Multiple treatment options were discussed. Pt describes her pain as tolerable. She is not interested in neuropathic medication at this time. I encouraged her to continue to perform her daily HEP. Patient is neurologically intact. I will see the patient back prn.      Written by Clay Cedillo Jasmine Goodman, as dictated by Latasha Gardiner MD  I examined the patient, reviewed and agree with the note.

## 2020-08-17 ENCOUNTER — OFFICE VISIT (OUTPATIENT)
Dept: ORTHOPEDIC SURGERY | Age: 65
End: 2020-08-17

## 2020-08-17 VITALS
DIASTOLIC BLOOD PRESSURE: 73 MMHG | WEIGHT: 253 LBS | SYSTOLIC BLOOD PRESSURE: 138 MMHG | BODY MASS INDEX: 45.54 KG/M2 | OXYGEN SATURATION: 100 % | HEART RATE: 76 BPM | TEMPERATURE: 98.4 F

## 2020-08-17 DIAGNOSIS — M47.817 LUMBOSACRAL SPONDYLOSIS WITHOUT MYELOPATHY: Primary | ICD-10-CM

## 2020-08-17 DIAGNOSIS — M51.36 DDD (DEGENERATIVE DISC DISEASE), LUMBAR: ICD-10-CM

## 2020-08-17 DIAGNOSIS — M43.10 SPONDYLOLISTHESIS, ACQUIRED: ICD-10-CM

## 2020-08-17 DIAGNOSIS — E66.01 OBESITY, MORBID (HCC): ICD-10-CM

## 2020-08-17 DIAGNOSIS — M54.16 LUMBAR NEURITIS: ICD-10-CM

## 2020-08-17 NOTE — LETTER
8/17/20 Patient: Cornell Sepulveda YOB: 1955 Date of Visit: 8/17/2020 Nancy Higgins MD 
[address] [facility] [address] VIA Facsimile: [phone] Dear Nancy Higgins MD, Thank you for referring Ms. Stephanie Mosher to [address] for evaluation. My notes for this consultation are attached. If you have questions, please do not hesitate to call me. I look forward to following your patient along with you. Sincerely, Jarad Martins MD

## 2022-03-19 PROBLEM — E66.01 OBESITY, MORBID (HCC): Status: ACTIVE | Noted: 2020-07-20

## 2023-05-30 ENCOUNTER — HOSPITAL ENCOUNTER (OUTPATIENT)
Facility: HOSPITAL | Age: 68
Setting detail: RECURRING SERIES
Discharge: HOME | End: 2023-06-02
Payer: COMMERCIAL

## 2023-05-30 PROCEDURE — 97162 PT EVAL MOD COMPLEX 30 MIN: CPT | Performed by: PHYSICAL THERAPIST

## 2023-05-30 PROCEDURE — 97110 THERAPEUTIC EXERCISES: CPT | Performed by: PHYSICAL THERAPIST

## 2023-05-30 NOTE — THERAPY EVALUATION
[address] [place] [address] PHYSICAL THERAPY  [address], [address] [facility] Ph: [phone] Fx: [phone]  Plan of Care / Statement of Necessity for Physical Therapy Services     Patient Name: Lexie Ross : 1955   Medical   Diagnosis: Other low back pain [M54.59]  Left hip pain [M25.552] Treatment Diagnosis:   M25.552  LEFT HIP PAIN  and M54.59  OTHER LOWER BACK PAIN    Onset Date: 2023     Referral Source: Dustin Cook MD Start of Care [facility]): 2023   Prior Hospitalization: See medical history Provider #: [id number]   Prior Level of Function: Independent, walk for exercise daily. 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 Comorbidities: Arthritis, Asthma, Back pain, BMI 43.7, Diabetes, Headaches, HBP   Medications: Verified on Patient Summary List     Assessment / key information:  Patient with signs and symptoms consistent with acute lower back pain with pain into her left LE. Patient noted sudden onset of lower back pain that evolved into including her left hip and thigh with pain shooting into her left foot. She has limited AROM of the L/S with pain on extension and rotation. Core strength is 2/5 and she has proximal LE strength deficits. Her gait is guarded and the has (+) start up pain. Functionally, she is unable to walk for prolonged periods and has stopped walking for exercise with her . Patient will benefit from a program of skilled physical therapy to include therapeutic exercises to address strength deficits, therapeutic activities to improve functional mobility, neuromuscular reeducation to address balance, coordination and proprioception, manual therapy to address ROM and tissue extensibility and modalities as indicated. All questions were answered.       Evaluation Complexity:  History:  MEDIUM  Complexity : 1-2 comorbidities / personal factors will impact the outcome/ POC ; Examination:  MEDIUM Complexity : 3 Standardized tests
Care: Needs help getting into and out of the shower. 20 min [x]Eval        - untimed        Therapeutic Procedures: Tx Min Billable or 1:1 Min (if diff from Tx Min) Procedure, Rationale, Specifics   19  02764 Therapeutic Exercise (timed):  increase ROM, strength, coordination, balance, and proprioception to improve patient's ability to progress to PLOF and address remaining functional goals. (see flow sheet as applicable)     Details if applicable: Total    19 Total Reminder: MC/BC bill using total billable min of TIMED therapeutic procedures (example: do not include dry needle or estim unattended, both untimed codes, in totals to left)     [x]  Patient Education billed concurrently with other procedures     Other Objective/Functional Measures:     Physical Therapy Evaluation - Lumbar Spine ([facility])    SUBJECTIVE  Symptoms:  Aggravated by:   [] Bending [] Sitting [x] Standing [x] Walking   [x] Moving [x] Cough [x] Sneeze [] Valsalva   [] AM  [x] PM  Lying:  [] sup   [] pro   [x] Sidelying left   [] Other:     Eased by:    [] Bending [x] Sitting [] Standing [] Walking   [] Moving [x] AM  [] PM  Lying: [x] sup  [] pro  [x] Sidelying right   [] Other:     Diagnostic Tests: [] Lab work [] X-rays    [] CT [] MRI     [] Other:  Results:None    OBJECTIVE  Posture:  Lateral Shift: [] R    [] L     [] +  [x] -  Kyphosis: [x] Increased [] Decreased   []  WNL  Lordosis:  [] Increased [x] Decreased   [] WNL  Pelvic symmetry: [x] WNL    [] Other:    Gait:  [] Normal     [x] Abnormal: (+) start up pain.   Gait is guarded    Active Movements: [] N/A   [] Too acute   [] Other:  ROM % AROM limitation Comments:pain, area   Forward flexion 40-60 WNL    Extension 20-30 75% Pain left buttock   SB right 20-30 WNL    SB left 20-30 WNL    Rotation right 5-10 25% Pain in the back   Rotation left 5-10 25% Pain in the back         Strength   L(0-5) R (0-5) N/T   Hip Flexion (L1,2) 4+ 4+ []   Knee Extension (L3,4) 4+ 4+ []   Ankle

## 2023-06-01 ENCOUNTER — TELEPHONE (OUTPATIENT)
Facility: HOSPITAL | Age: 68
End: 2023-06-01

## 2023-06-06 ENCOUNTER — HOSPITAL ENCOUNTER (OUTPATIENT)
Facility: HOSPITAL | Age: 68
Setting detail: RECURRING SERIES
Discharge: HOME | End: 2023-06-09
Payer: COMMERCIAL

## 2023-06-06 PROCEDURE — 97110 THERAPEUTIC EXERCISES: CPT | Performed by: PHYSICAL THERAPIST

## 2023-06-06 PROCEDURE — 97112 NEUROMUSCULAR REEDUCATION: CPT | Performed by: PHYSICAL THERAPIST

## 2023-06-06 NOTE — PROGRESS NOTES
program.  Evaluation:   Patient given a written/illustrated HEP. Current:  Patient reports compliance with her HEP.  6/6/2023. Goal Met. Long Term Goals: To be accomplished in 4 weeks  1. Patient's pain level will be 2-3/10 with activity in order to improve patient's ability to perform normal ADLs. Evaluation:  4/10-10/10  2. Patient will demonstrate AROM Lumbar Spine WFL with minimal c/o pain to increase ease of ADLs. Evaluation:  AROM L/S WNL for flex, SB, 75% limited into extension and 25% limited into right and left rotation. 3. Patient will increase FOTO score to  54 to indicate increased functional mobility. Evaluation:  35  4. Patient will report little limitation with walking around a room in order to improve her functional mobility. Evaluation:  Notes this is limited a lot.     PLAN  Yes  Continue plan of care  [x]  Upgrade activities as tolerated  []  Discharge due to :  []  Other:    Garry Bradley, PT    6/6/2023    8:53 AM    Future Appointments   Date Time Provider Chung Tran   6/6/2023  4:20 PM Garry Bradley, PT [facility]   6/8/2023  3:40 PM Maddi Simon, PTA [facility]   6/13/2023  4:20 PM Garry Bradley PT [facility]   6/15/2023  3:40 PM Tutu Wood PTA [facility]   6/20/2023  4:20 PM Maddi Simon PTA [facility]   6/22/2023  3:00 PM Debra Nazario PTA [facility]

## 2023-06-08 ENCOUNTER — HOSPITAL ENCOUNTER (OUTPATIENT)
Facility: HOSPITAL | Age: 68
Setting detail: RECURRING SERIES
End: 2023-06-08
Payer: COMMERCIAL

## 2023-06-15 ENCOUNTER — HOSPITAL ENCOUNTER (OUTPATIENT)
Facility: HOSPITAL | Age: 68
Setting detail: RECURRING SERIES
Discharge: HOME | End: 2023-06-18
Payer: COMMERCIAL

## 2023-06-15 PROCEDURE — 97530 THERAPEUTIC ACTIVITIES: CPT

## 2023-06-15 PROCEDURE — 97112 NEUROMUSCULAR REEDUCATION: CPT

## 2023-06-15 PROCEDURE — 97110 THERAPEUTIC EXERCISES: CPT

## 2023-06-20 ENCOUNTER — HOSPITAL ENCOUNTER (OUTPATIENT)
Facility: HOSPITAL | Age: 68
Setting detail: RECURRING SERIES
Discharge: HOME | End: 2023-06-23
Payer: COMMERCIAL

## 2023-06-20 PROCEDURE — 97110 THERAPEUTIC EXERCISES: CPT

## 2023-06-20 PROCEDURE — 97112 NEUROMUSCULAR REEDUCATION: CPT

## 2023-06-20 PROCEDURE — 97140 MANUAL THERAPY 1/> REGIONS: CPT

## 2023-06-20 NOTE — PROGRESS NOTES
PHYSICAL / OCCUPATIONAL THERAPY - DAILY TREATMENT NOTE (updated )    Patient Name: Beatrice Macias    Date: 2023    : 1955  Insurance: Payor: Shayy Mota / Plan: [address] [place] [address] / Product Type: *No Product type* /      Patient  verified Yes     Visit #   Current / Total 5 8   Time   In / Out 420 510   Pain   In / Out 2/10 0/10   Subjective Functional Status/Changes: Pt states back feels better today but reports her left hip has been hurting more. TREATMENT AREA =  Other low back pain [M54.59]  Left hip pain [M25.552]    OBJECTIVE    Modalities Rationale:     decrease pain and increase tissue extensibility to improve patient's ability to progress to PLOF and address remaining functional goals. min [] Estim Unattended, type/location:                                      []  w/ice    []  w/heat    min [] Estim Attended, type/location:                                     []  w/US     []  w/ice    []  w/heat    []  TENS insruct      min []  Mechanical Traction: type/lbs                   []  pro   []  sup   []  int   []  cont    []  before manual    []  after manual    min []  Ultrasound, settings/location:      min []  Iontophoresis w/ dexamethasone, location:                                               []  take home patch       []  in clinic    min  unbill []  Ice     []  Heat    location/position:     min []  Paraffin,  details:     min []  Vasopneumatic Device, press/temp:     min []  Gaurav Back / Grisel Conradi: If using vaso (only need to measure limb vaso being performed on)      pre-treatment girth :       post-treatment girth :       measured at (landmark location) :      min []  Other:    Skin assessment post-treatment (if applicable):    []  intact    []  redness- no adverse reaction                 []redness - adverse reaction:         Therapeutic Procedures:     Tx Min Billable or 1:1 Min (if diff from Tx Min) Procedure, Rationale, Specifics   20  99555 Therapeutic Exercise (timed):

## 2023-06-22 ENCOUNTER — HOSPITAL ENCOUNTER (OUTPATIENT)
Facility: HOSPITAL | Age: 68
Setting detail: RECURRING SERIES
Discharge: HOME | End: 2023-06-25
Payer: COMMERCIAL

## 2023-06-22 PROCEDURE — 97530 THERAPEUTIC ACTIVITIES: CPT

## 2023-06-22 PROCEDURE — 97112 NEUROMUSCULAR REEDUCATION: CPT

## 2023-06-22 PROCEDURE — 97110 THERAPEUTIC EXERCISES: CPT

## 2023-06-26 ENCOUNTER — HOSPITAL ENCOUNTER (OUTPATIENT)
Facility: HOSPITAL | Age: 68
Setting detail: RECURRING SERIES
Discharge: HOME | End: 2023-06-29
Payer: COMMERCIAL

## 2023-06-26 PROCEDURE — 97110 THERAPEUTIC EXERCISES: CPT

## 2023-06-26 PROCEDURE — 97112 NEUROMUSCULAR REEDUCATION: CPT

## 2023-06-28 ENCOUNTER — TELEPHONE (OUTPATIENT)
Facility: HOSPITAL | Age: 68
End: 2023-06-28

## 2023-06-28 ENCOUNTER — APPOINTMENT (OUTPATIENT)
Facility: HOSPITAL | Age: 68
End: 2023-06-28
Payer: COMMERCIAL

## 2023-06-29 ENCOUNTER — HOSPITAL ENCOUNTER (OUTPATIENT)
Facility: HOSPITAL | Age: 68
Setting detail: RECURRING SERIES
End: 2023-06-29
Payer: COMMERCIAL

## 2023-06-29 PROCEDURE — 97110 THERAPEUTIC EXERCISES: CPT | Performed by: PHYSICAL THERAPIST

## 2023-06-29 PROCEDURE — 97140 MANUAL THERAPY 1/> REGIONS: CPT | Performed by: PHYSICAL THERAPIST

## 2023-06-29 PROCEDURE — 97530 THERAPEUTIC ACTIVITIES: CPT | Performed by: PHYSICAL THERAPIST

## 2023-06-29 PROCEDURE — 97112 NEUROMUSCULAR REEDUCATION: CPT | Performed by: PHYSICAL THERAPIST
